# Patient Record
Sex: MALE | Race: WHITE | Employment: OTHER | URBAN - METROPOLITAN AREA
[De-identification: names, ages, dates, MRNs, and addresses within clinical notes are randomized per-mention and may not be internally consistent; named-entity substitution may affect disease eponyms.]

---

## 2017-01-26 ENCOUNTER — TELEPHONE (OUTPATIENT)
Dept: FAMILY MEDICINE CLINIC | Facility: CLINIC | Age: 42
End: 2017-01-26

## 2017-01-26 DIAGNOSIS — E11.9 TYPE 2 DIABETES MELLITUS WITHOUT COMPLICATION, WITHOUT LONG-TERM CURRENT USE OF INSULIN (HCC): Primary | ICD-10-CM

## 2017-01-26 RX ORDER — GLIMEPIRIDE 2 MG/1
2 TABLET ORAL 2 TIMES DAILY
COMMUNITY
Start: 2014-11-20 | End: 2017-01-26

## 2017-01-26 RX ORDER — FENOFIBRATE 48 MG/1
48 TABLET, COATED ORAL DAILY
COMMUNITY
Start: 2015-05-18 | End: 2017-08-04

## 2017-01-26 RX ORDER — ATORVASTATIN CALCIUM 10 MG/1
10 TABLET, FILM COATED ORAL DAILY
COMMUNITY
Start: 2015-02-22 | End: 2017-08-04

## 2017-01-26 RX ORDER — GLIMEPIRIDE 2 MG/1
2 TABLET ORAL 2 TIMES DAILY
Qty: 60 TABLET | Refills: 0 | Status: SHIPPED | OUTPATIENT
Start: 2017-01-26 | End: 2017-02-24

## 2017-01-26 RX ORDER — OMEPRAZOLE 20 MG/1
20 CAPSULE, DELAYED RELEASE ORAL DAILY
COMMUNITY
Start: 2007-02-08 | End: 2019-02-20

## 2017-01-26 NOTE — TELEPHONE ENCOUNTER
Last labs done 8/9/16:  HgbA1c 10.6%, elevated lipid panel. Orders for labs placed. Please tell pt he needs to be fasting for this. Will only refill one month of medications listed below. Keep scheduled appt.

## 2017-01-26 NOTE — TELEPHONE ENCOUNTER
Future Appointments  Date Time Provider Jaime Garcia   2/16/2017 8:45 AM REF [place] [facility]   LOV: 08/08/2016

## 2017-01-26 NOTE — TELEPHONE ENCOUNTER
Needs refill for metformin and glimiperide and also has upcoming appt for labs on 2/16 for repeat labs- orders need to be entered

## 2017-01-27 NOTE — TELEPHONE ENCOUNTER
Let pt know the following below. Pt verbalized his understanding and has no other questions at this time.

## 2017-02-06 ENCOUNTER — TELEPHONE (OUTPATIENT)
Dept: FAMILY MEDICINE CLINIC | Facility: CLINIC | Age: 42
End: 2017-02-06

## 2017-02-16 ENCOUNTER — LAB ENCOUNTER (OUTPATIENT)
Dept: LAB | Age: 42
End: 2017-02-16
Attending: FAMILY MEDICINE
Payer: COMMERCIAL

## 2017-02-16 DIAGNOSIS — E11.9 TYPE 2 DIABETES MELLITUS WITHOUT COMPLICATION, WITHOUT LONG-TERM CURRENT USE OF INSULIN (HCC): ICD-10-CM

## 2017-02-16 LAB
ALBUMIN SERPL-MCNC: 3.9 G/DL (ref 3.5–4.8)
ALP LIVER SERPL-CCNC: 90 U/L (ref 45–117)
ALT SERPL-CCNC: 56 U/L (ref 17–63)
AST SERPL-CCNC: 28 U/L (ref 15–41)
BILIRUB SERPL-MCNC: 0.5 MG/DL (ref 0.1–2)
BUN BLD-MCNC: 18 MG/DL (ref 8–20)
CALCIUM BLD-MCNC: 9 MG/DL (ref 8.3–10.3)
CHLORIDE: 99 MMOL/L (ref 101–111)
CHOLEST SMN-MCNC: 249 MG/DL (ref ?–200)
CO2: 30 MMOL/L (ref 22–32)
CREAT BLD-MCNC: 1.05 MG/DL (ref 0.7–1.3)
EST. AVERAGE GLUCOSE BLD GHB EST-MCNC: 266 MG/DL (ref 68–126)
GLUCOSE BLD-MCNC: 308 MG/DL (ref 70–99)
HBA1C MFR BLD HPLC: 10.9 % (ref ?–5.7)
HDLC SERPL-MCNC: 46 MG/DL (ref 45–?)
HDLC SERPL: 5.41 {RATIO} (ref ?–4.97)
LDLC SERPL DIRECT ASSAY-MCNC: 144 MG/DL (ref ?–100)
M PROTEIN MFR SERPL ELPH: 7.5 G/DL (ref 6.1–8.3)
NONHDLC SERPL-MCNC: 203 MG/DL (ref ?–130)
POTASSIUM SERPL-SCNC: 4.3 MMOL/L (ref 3.6–5.1)
SODIUM SERPL-SCNC: 135 MMOL/L (ref 136–144)
TRIGLYCERIDES: 432 MG/DL (ref ?–150)

## 2017-02-16 PROCEDURE — 80061 LIPID PANEL: CPT

## 2017-02-16 PROCEDURE — 83036 HEMOGLOBIN GLYCOSYLATED A1C: CPT

## 2017-02-16 PROCEDURE — 80053 COMPREHEN METABOLIC PANEL: CPT

## 2017-02-16 PROCEDURE — 83721 ASSAY OF BLOOD LIPOPROTEIN: CPT

## 2017-02-17 ENCOUNTER — TELEPHONE (OUTPATIENT)
Dept: FAMILY MEDICINE CLINIC | Facility: CLINIC | Age: 42
End: 2017-02-17

## 2017-02-17 NOTE — TELEPHONE ENCOUNTER
Scheduled appt. Future Appointments  Date Time Provider Jaime Garcia   2/23/2017 8:00 AM Princess Thomas MD [facility] [place] [facility]     Patient would like just some idea of what hes labs came back as. Dr Chace Crawford can you please advise. Thank you.

## 2017-02-17 NOTE — TELEPHONE ENCOUNTER
Please inform patient that his hemoglobin A1c and glucose is a very high. Also his triglyceride and LDL are also very very high. Recommend to make an appointment with me to discuss treatment option.

## 2017-02-17 NOTE — TELEPHONE ENCOUNTER
Let pt know the following below. Pt verbalized his understanding and had no other questions at this time.    Future Appointments  Date Time Provider Jaime Garcia   2/23/2017 8:00 AM Edda Baxter MD [facility] [place] [facility]

## 2017-02-17 NOTE — TELEPHONE ENCOUNTER
----- Message from Ambar Ramos MD sent at 2/16/2017  5:36 PM CST -----  Advice patient to make appt to discuss labs.

## 2017-02-23 ENCOUNTER — OFFICE VISIT (OUTPATIENT)
Dept: FAMILY MEDICINE CLINIC | Facility: CLINIC | Age: 42
End: 2017-02-23

## 2017-02-23 VITALS
HEIGHT: 64.5 IN | SYSTOLIC BLOOD PRESSURE: 116 MMHG | BODY MASS INDEX: 32.28 KG/M2 | WEIGHT: 191.38 LBS | TEMPERATURE: 97 F | HEART RATE: 78 BPM | DIASTOLIC BLOOD PRESSURE: 82 MMHG

## 2017-02-23 DIAGNOSIS — E11.8 UNCONTROLLED TYPE 2 DIABETES MELLITUS WITH COMPLICATION, WITHOUT LONG-TERM CURRENT USE OF INSULIN (HCC): Primary | ICD-10-CM

## 2017-02-23 DIAGNOSIS — I10 ESSENTIAL HYPERTENSION: ICD-10-CM

## 2017-02-23 DIAGNOSIS — E11.65 UNCONTROLLED TYPE 2 DIABETES MELLITUS WITH COMPLICATION, WITHOUT LONG-TERM CURRENT USE OF INSULIN (HCC): Primary | ICD-10-CM

## 2017-02-23 DIAGNOSIS — E78.1 PURE HYPERTRIGLYCERIDEMIA: ICD-10-CM

## 2017-02-23 PROCEDURE — 99214 OFFICE O/P EST MOD 30 MIN: CPT | Performed by: FAMILY MEDICINE

## 2017-02-23 NOTE — PROGRESS NOTES
[address]  PROGRESS NOTE  Chief Complaint:   Patient presents with: Follow - Up: on labs       HPI:   This is a 40year old male presents for follow-up on his labs and diabetes. Patient has been noncompliant with this medication.   He Use: No              Family History:  Family History   Problem Relation Age of Onset   • Diabetes Sister    • Diabetes Paternal Grandfather      Allergies:    Metformin                   Comment:Other reaction(s): diarrhea  Niacin                      Comm enlarged nodes   PSYCHIATRIC:  Denies depression or anxiety. ENDOCRINOLOGIC:  Denies excessive sweating, cold or heat intolerance, polyuria or polydipsia.         EXAM:   /82 mmHg  Pulse 78  Temp(Src) 97.3 °F (36.3 °C) (Tympanic)  Ht 64.5\"  Wt 191 l Exam due on 01/30/1976  Annual Physical due on 01/30/1978  Influenza Vaccine(1) due on 09/01/2016    Patient/Caregiver Education: Patient/Caregiver Education: There are no barriers to learning. Medical education done.    Outcome: Patient verbalizes Benita

## 2017-02-23 NOTE — PATIENT INSTRUCTIONS
Continue current medications   Increase metformin to 1000 mg twice a day   Advice low carb in diet, AVOID FOOD WITH HIGH GLYCEMIC INDEX, have smaller portion meal, no juice or soda, don't eat late at night, exercise, weight loss.    Continue to monitor gluc

## 2017-02-24 ENCOUNTER — TELEPHONE (OUTPATIENT)
Dept: FAMILY MEDICINE CLINIC | Facility: CLINIC | Age: 42
End: 2017-02-24

## 2017-02-24 DIAGNOSIS — E11.9 TYPE 2 DIABETES MELLITUS WITHOUT COMPLICATION, WITHOUT LONG-TERM CURRENT USE OF INSULIN (HCC): Primary | ICD-10-CM

## 2017-02-24 RX ORDER — GLIMEPIRIDE 2 MG/1
2 TABLET ORAL 2 TIMES DAILY
Qty: 60 TABLET | Refills: 2 | Status: SHIPPED | OUTPATIENT
Start: 2017-02-24 | End: 2017-06-05

## 2017-04-18 ENCOUNTER — OFFICE VISIT (OUTPATIENT)
Dept: FAMILY MEDICINE CLINIC | Facility: CLINIC | Age: 42
End: 2017-04-18

## 2017-04-18 VITALS
SYSTOLIC BLOOD PRESSURE: 120 MMHG | RESPIRATION RATE: 16 BRPM | HEIGHT: 64.75 IN | WEIGHT: 191.13 LBS | TEMPERATURE: 98 F | DIASTOLIC BLOOD PRESSURE: 66 MMHG | BODY MASS INDEX: 32.24 KG/M2 | HEART RATE: 82 BPM

## 2017-04-18 DIAGNOSIS — R10.84 GENERALIZED ABDOMINAL PAIN: Primary | ICD-10-CM

## 2017-04-18 DIAGNOSIS — E11.9 TYPE 2 DIABETES MELLITUS WITHOUT COMPLICATION, WITHOUT LONG-TERM CURRENT USE OF INSULIN (HCC): ICD-10-CM

## 2017-04-18 DIAGNOSIS — R19.7 DIARRHEA, UNSPECIFIED TYPE: ICD-10-CM

## 2017-04-18 PROCEDURE — 99214 OFFICE O/P EST MOD 30 MIN: CPT | Performed by: FAMILY MEDICINE

## 2017-04-18 NOTE — PROGRESS NOTES
[facility] [place]  PROGRESS NOTE  Chief Complaint:   Patient presents with:  Constipation: Patient is having issues going to the bathroom, has tried to eat more of a fiber diet not working      HPI:   This is a 40year old male coming in for ab mmol/L   -DIRECT LDL   Result Value Ref Range   Direct  (H) <100 mg/dL       Past Medical History    Diagnosis  Date    •  Pneumonia      •  Hypertension      •  Diabetes type 2, controlled (HCC)              Past Surgical History      VASECTOMY    production    GASTROINTESTINAL: SEE HPI     : denies dysuria, no urinary frequency , no discharge. MUSCULOSKELETAL:  Denies weakness, muscle aches, back pain, joint pain, swelling or stiffness.   NEUROLOGICAL:  Denies headache, , dizziness, syncope, para PLAN:   1. Generalized abdominal pain  2. Diarrhea, unspecified type    - referral [facility]    3.  Type 2 diabetes mellitus without complication, without long-term current use of insulin ([facility] [address]    - referral [facility]      Patient Instructions   Obtain referral f

## 2017-06-05 RX ORDER — GLIMEPIRIDE 2 MG/1
TABLET ORAL
Qty: 60 TABLET | Refills: 0 | Status: SHIPPED | OUTPATIENT
Start: 2017-06-05 | End: 2017-07-14

## 2017-06-05 NOTE — TELEPHONE ENCOUNTER
2/23/17 last OV with Dr. Xiao Barrett  2/16/17 last labs done    Chavo Mahoney, 06/05/2017, 11:23 AM

## 2017-07-14 RX ORDER — GLIMEPIRIDE 2 MG/1
2 TABLET ORAL 2 TIMES DAILY
Qty: 60 TABLET | Refills: 0 | Status: SHIPPED | OUTPATIENT
Start: 2017-07-14 | End: 2017-08-04

## 2017-07-14 RX ORDER — GLIMEPIRIDE 2 MG/1
TABLET ORAL
Qty: 60 TABLET | Refills: 0 | OUTPATIENT
Start: 2017-07-14

## 2017-07-14 NOTE — TELEPHONE ENCOUNTER
Future Appointments  Date Time Provider Jaime Garcia   8/4/2017 8:00 AM Abram Mann MD [facility] [place] [facility] [place]     Can you please send in the refill. Thank you.

## 2017-08-04 ENCOUNTER — OFFICE VISIT (OUTPATIENT)
Dept: FAMILY MEDICINE CLINIC | Facility: CLINIC | Age: 42
End: 2017-08-04

## 2017-08-04 VITALS
HEART RATE: 78 BPM | WEIGHT: 186.63 LBS | DIASTOLIC BLOOD PRESSURE: 80 MMHG | SYSTOLIC BLOOD PRESSURE: 124 MMHG | RESPIRATION RATE: 20 BRPM | TEMPERATURE: 98 F | BODY MASS INDEX: 31 KG/M2

## 2017-08-04 DIAGNOSIS — I10 ESSENTIAL HYPERTENSION: ICD-10-CM

## 2017-08-04 DIAGNOSIS — E11.8 CONTROLLED TYPE 2 DIABETES MELLITUS WITH COMPLICATION, WITHOUT LONG-TERM CURRENT USE OF INSULIN (HCC): Primary | ICD-10-CM

## 2017-08-04 DIAGNOSIS — E78.1 PURE HYPERTRIGLYCERIDEMIA: ICD-10-CM

## 2017-08-04 LAB
ALBUMIN SERPL-MCNC: 3.9 G/DL (ref 3.5–4.8)
ALP LIVER SERPL-CCNC: 92 U/L (ref 45–117)
ALT SERPL-CCNC: 44 U/L (ref 17–63)
AST SERPL-CCNC: 22 U/L (ref 15–41)
BASOPHILS # BLD AUTO: 0.05 X10(3) UL (ref 0–0.1)
BASOPHILS NFR BLD AUTO: 0.6 %
BILIRUB SERPL-MCNC: 0.6 MG/DL (ref 0.1–2)
BUN BLD-MCNC: 21 MG/DL (ref 8–20)
CALCIUM BLD-MCNC: 9.3 MG/DL (ref 8.3–10.3)
CHLORIDE: 102 MMOL/L (ref 101–111)
CHOLEST SMN-MCNC: 234 MG/DL (ref ?–200)
CO2: 29 MMOL/L (ref 22–32)
CREAT BLD-MCNC: 1.01 MG/DL (ref 0.7–1.3)
CREAT UR-SCNC: 294 MG/DL
EOSINOPHIL # BLD AUTO: 0.9 X10(3) UL (ref 0–0.3)
EOSINOPHIL NFR BLD AUTO: 10.4 %
ERYTHROCYTE [DISTWIDTH] IN BLOOD BY AUTOMATED COUNT: 12.4 % (ref 11.5–16)
EST. AVERAGE GLUCOSE BLD GHB EST-MCNC: 226 MG/DL (ref 68–126)
GLUCOSE BLD-MCNC: 237 MG/DL (ref 70–99)
HBA1C MFR BLD HPLC: 9.5 % (ref ?–5.7)
HCT VFR BLD AUTO: 49.7 % (ref 37–53)
HDLC SERPL-MCNC: 44 MG/DL (ref 45–?)
HDLC SERPL: 5.32 {RATIO} (ref ?–4.97)
HGB BLD-MCNC: 17.5 G/DL (ref 13–17)
IMMATURE GRANULOCYTE COUNT: 0.04 X10(3) UL (ref 0–1)
IMMATURE GRANULOCYTE RATIO %: 0.5 %
LDLC SERPL DIRECT ASSAY-MCNC: 121 MG/DL (ref ?–100)
LYMPHOCYTES # BLD AUTO: 2.8 X10(3) UL (ref 0.9–4)
LYMPHOCYTES NFR BLD AUTO: 32.4 %
M PROTEIN MFR SERPL ELPH: 7.7 G/DL (ref 6.1–8.3)
MCH RBC QN AUTO: 29.9 PG (ref 27–33.2)
MCHC RBC AUTO-ENTMCNC: 35.2 G/DL (ref 31–37)
MCV RBC AUTO: 84.8 FL (ref 80–99)
MICROALBUMIN UR-MCNC: 23.5 MG/DL
MICROALBUMIN/CREAT 24H UR-RTO: 79.9 UG/MG (ref ?–30)
MONOCYTES # BLD AUTO: 0.51 X10(3) UL (ref 0.1–0.6)
MONOCYTES NFR BLD AUTO: 5.9 %
NEUTROPHIL ABS PRELIM: 4.34 X10 (3) UL (ref 1.3–6.7)
NEUTROPHILS # BLD AUTO: 4.34 X10(3) UL (ref 1.3–6.7)
NEUTROPHILS NFR BLD AUTO: 50.2 %
NONHDLC SERPL-MCNC: 190 MG/DL (ref ?–130)
PLATELET # BLD AUTO: 226 10(3)UL (ref 150–450)
POTASSIUM SERPL-SCNC: 4.5 MMOL/L (ref 3.6–5.1)
RBC # BLD AUTO: 5.86 X10(6)UL (ref 4.3–5.7)
RED CELL DISTRIBUTION WIDTH-SD: 37.7 FL (ref 35.1–46.3)
SODIUM SERPL-SCNC: 138 MMOL/L (ref 136–144)
TRIGLYCERIDES: 527 MG/DL (ref ?–150)
WBC # BLD AUTO: 8.6 X10(3) UL (ref 4–13)

## 2017-08-04 PROCEDURE — 80053 COMPREHEN METABOLIC PANEL: CPT | Performed by: FAMILY MEDICINE

## 2017-08-04 PROCEDURE — 80061 LIPID PANEL: CPT | Performed by: FAMILY MEDICINE

## 2017-08-04 PROCEDURE — 83036 HEMOGLOBIN GLYCOSYLATED A1C: CPT | Performed by: FAMILY MEDICINE

## 2017-08-04 PROCEDURE — 82043 UR ALBUMIN QUANTITATIVE: CPT | Performed by: FAMILY MEDICINE

## 2017-08-04 PROCEDURE — 83721 ASSAY OF BLOOD LIPOPROTEIN: CPT | Performed by: FAMILY MEDICINE

## 2017-08-04 PROCEDURE — 99214 OFFICE O/P EST MOD 30 MIN: CPT | Performed by: FAMILY MEDICINE

## 2017-08-04 PROCEDURE — 82570 ASSAY OF URINE CREATININE: CPT | Performed by: FAMILY MEDICINE

## 2017-08-04 PROCEDURE — 85025 COMPLETE CBC W/AUTO DIFF WBC: CPT | Performed by: FAMILY MEDICINE

## 2017-08-04 RX ORDER — ATORVASTATIN CALCIUM 10 MG/1
10 TABLET, FILM COATED ORAL DAILY
Qty: 90 TABLET | Refills: 0 | Status: SHIPPED | OUTPATIENT
Start: 2017-08-04 | End: 2019-02-21

## 2017-08-04 RX ORDER — GLIMEPIRIDE 2 MG/1
2 TABLET ORAL 2 TIMES DAILY
Qty: 180 TABLET | Refills: 0 | Status: SHIPPED | OUTPATIENT
Start: 2017-08-04 | End: 2017-08-05

## 2017-08-04 RX ORDER — FENOFIBRATE 48 MG/1
48 TABLET, COATED ORAL DAILY
Qty: 90 TABLET | Refills: 0 | Status: SHIPPED | OUTPATIENT
Start: 2017-08-04 | End: 2018-06-29

## 2017-08-04 NOTE — PROGRESS NOTES
[address]  PROGRESS NOTE  Chief Complaint:   Patient presents with:  Diabetic Flu: bg 120 to 200. HPI:   This is a 40year old male presents for follow-up on diabetes, hypertension and hypercholesterol.   Patient's blood sugar at h Packs/day: 0.00      Years: 0.00         Quit date: 2/23/2016  Smokeless tobacco: Never Used                      Alcohol use:  No              Social History Narrative    , owns HVAC      Family History:  Fami numbness or tingling in the extremities,change in bowel or bladder control. HEMATOLOGIC:  Denies anemia, bleeding or bruising. LYMPHATICS:  Denies enlarged nodes   PSYCHIATRIC:  Denies depression or anxiety.   ENDOCRINOLOGIC:  Denies excessive sweating, c flu.    Diagnoses and all orders for this visit:    Controlled type 2 diabetes mellitus with complication, without long-term current use of insulin (HCC)  -     COMP METABOLIC PANEL (14)  -     HEMOGLOBIN A1C  -     MICROALB/CREAT RATIO, RANDOM URINE    Es

## 2017-08-04 NOTE — PATIENT INSTRUCTIONS
Continue current medications   Check labs today. Advice low carb in diet, AVOID FOOD WITH HIGH GLYCEMIC INDEX, have smaller portion meal, no juice or soda, don't eat late at night, exercise, weight loss.    Continue to monitor glucose level, call if gluco

## 2017-08-05 ENCOUNTER — TELEPHONE (OUTPATIENT)
Dept: FAMILY MEDICINE CLINIC | Facility: CLINIC | Age: 42
End: 2017-08-05

## 2017-08-05 RX ORDER — GLIMEPIRIDE 4 MG/1
4 TABLET ORAL 2 TIMES DAILY
Qty: 180 TABLET | Refills: 3 | Status: SHIPPED | OUTPATIENT
Start: 2017-08-05 | End: 2017-11-29

## 2017-08-05 NOTE — TELEPHONE ENCOUNTER
Please inform patient that his HgA1c is 9.5. Also his triglycerides and cholesterol is very high. He needs better control of his diabetes. His average glucose level is past 3 months is 226.    This is very dangerous, if his diabetes remains uncontrolled t

## 2017-08-05 NOTE — TELEPHONE ENCOUNTER
Patient informed of the below results and recommendations. Patient would like new script for Glimeperide faxed to [facility], [place]. Script pended for signing if correct. Please advise.  Steph Tidwell, 08/05/17, 8:54 AM

## 2017-10-26 NOTE — PROGRESS NOTES
[address] [place] [address]  PROGRESS NOTE  Mackenzie Long is a 40year old male. Chief Complaint:  Patient presents with:  Employment Physical    HPI:   Patient presents to office visit for work physical. Pt owns his own business.  Needs this physical co Onset   • Diabetes Sister    • Diabetes Paternal Grandfather      Allergies:    Niacin                      Comment:Other reaction(s): itching and burning  Current Meds:    Current Outpatient Prescriptions:  glimepiride 4 MG Oral Tab Take 1 tablet (4 mg to symmetrical  EXTREMITIES: no cyanosis, clubbing or edema  Psych: Alert and orientated ×3, appropriate affect    ASSESSMENT AND PLAN:   ASSESSMENT:  Type 2 diabetes mellitus without complication, without long-term current use of insulin (hcc)  Hyperlipidemi

## 2017-10-27 ENCOUNTER — OFFICE VISIT (OUTPATIENT)
Dept: FAMILY MEDICINE CLINIC | Facility: CLINIC | Age: 42
End: 2017-10-27

## 2017-10-27 ENCOUNTER — APPOINTMENT (OUTPATIENT)
Dept: LAB | Age: 42
End: 2017-10-27
Attending: NURSE PRACTITIONER
Payer: COMMERCIAL

## 2017-10-27 VITALS
DIASTOLIC BLOOD PRESSURE: 80 MMHG | WEIGHT: 189 LBS | BODY MASS INDEX: 32.27 KG/M2 | SYSTOLIC BLOOD PRESSURE: 110 MMHG | RESPIRATION RATE: 16 BRPM | HEIGHT: 64 IN | HEART RATE: 68 BPM | TEMPERATURE: 98 F

## 2017-10-27 DIAGNOSIS — Z00.00 ENCOUNTER FOR ANNUAL PHYSICAL EXAM: Primary | ICD-10-CM

## 2017-10-27 DIAGNOSIS — Z02.89 ENCOUNTER FOR PHYSICAL EXAMINATION RELATED TO EMPLOYMENT: ICD-10-CM

## 2017-10-27 DIAGNOSIS — E11.9 TYPE 2 DIABETES MELLITUS WITHOUT COMPLICATION, WITHOUT LONG-TERM CURRENT USE OF INSULIN (HCC): ICD-10-CM

## 2017-10-27 DIAGNOSIS — E78.5 HYPERLIPIDEMIA, UNSPECIFIED HYPERLIPIDEMIA TYPE: ICD-10-CM

## 2017-10-27 DIAGNOSIS — E66.9 CLASS 1 OBESITY WITHOUT SERIOUS COMORBIDITY WITH BODY MASS INDEX (BMI) OF 32.0 TO 32.9 IN ADULT, UNSPECIFIED OBESITY TYPE: ICD-10-CM

## 2017-10-27 PROBLEM — Z87.891 FORMER SMOKER: Status: ACTIVE | Noted: 2017-10-27

## 2017-10-27 PROCEDURE — 83036 HEMOGLOBIN GLYCOSYLATED A1C: CPT | Performed by: NURSE PRACTITIONER

## 2017-10-27 PROCEDURE — 36415 COLL VENOUS BLD VENIPUNCTURE: CPT | Performed by: NURSE PRACTITIONER

## 2017-10-27 PROCEDURE — 80053 COMPREHEN METABOLIC PANEL: CPT | Performed by: NURSE PRACTITIONER

## 2017-10-27 PROCEDURE — 99396 PREV VISIT EST AGE 40-64: CPT | Performed by: NURSE PRACTITIONER

## 2017-10-27 PROCEDURE — 80061 LIPID PANEL: CPT | Performed by: NURSE PRACTITIONER

## 2017-10-27 NOTE — PATIENT INSTRUCTIONS
Work form will be completed next week after blood work results are in. Will call when ready for . Blood work today. Return next month for follow up with PCP, Dr. Kari Brito.

## 2017-10-31 ENCOUNTER — TELEPHONE (OUTPATIENT)
Dept: FAMILY MEDICINE CLINIC | Facility: CLINIC | Age: 42
End: 2017-10-31

## 2017-10-31 NOTE — TELEPHONE ENCOUNTER
----- Message from ZELALEM Farris sent at 10/31/2017  8:02 AM CDT -----  CMP: Glucose 252, history type 2 diabetes. Rest of CMP essentially normal.  Make sure staying hydrated with water daily. Lipid panel:  Total cholesterol 264, which is higher than l

## 2017-10-31 NOTE — TELEPHONE ENCOUNTER
Patient notified of results and recommendation, patient states he has not been taking his atorvastatin regularly. States he barely take it. Notified will forward to PCP for further recommendations. Expressed understanding and thanks.

## 2017-11-01 ENCOUNTER — TELEPHONE (OUTPATIENT)
Dept: FAMILY MEDICINE CLINIC | Facility: CLINIC | Age: 42
End: 2017-11-01

## 2017-11-01 NOTE — TELEPHONE ENCOUNTER
Let pt know the following below. Pt verbalized his understanding and had no other questions at this time. Patient unable to set up appt at this time but will call back later today and set something up.

## 2017-11-01 NOTE — TELEPHONE ENCOUNTER
----- Message from Lolly Arias MD sent at 11/1/2017 12:59 PM CDT -----  Please inform patient to make appointment with me to discuss management of high glucose and cholesterol.

## 2017-11-29 ENCOUNTER — TELEPHONE (OUTPATIENT)
Dept: FAMILY MEDICINE CLINIC | Facility: CLINIC | Age: 42
End: 2017-11-29

## 2017-11-29 RX ORDER — GLIMEPIRIDE 4 MG/1
4 TABLET ORAL 2 TIMES DAILY
Qty: 180 TABLET | Refills: 0 | Status: SHIPPED | OUTPATIENT
Start: 2017-11-29 | End: 2018-03-23

## 2017-11-29 NOTE — TELEPHONE ENCOUNTER
Patient is calling stating that he is out of his glimepiride and there are no refills at the pharmacy for him. Patient stated that it was discussed with Dr Sabrina Pineda that Dr Sabrina Pineda wanted him to start a insulin injection.  Patient has been seeing advertisement

## 2017-11-29 NOTE — TELEPHONE ENCOUNTER
Let pt know the following below. Pt verbalized his understanding and had no other questions at this time.    Future Appointments  Date Time Provider Jaime Garcia   12/8/2017 8:00 AM Brayan Howell MD [facility] [place] [facility]

## 2017-11-29 NOTE — TELEPHONE ENCOUNTER
Recommend to continue with glimepiride. Lulla Barthel will be in addition and not instead of glimepiride. Will discuss further at follow up appointment. Recommend appointment. Prescription sent.

## 2017-12-08 ENCOUNTER — OFFICE VISIT (OUTPATIENT)
Dept: FAMILY MEDICINE CLINIC | Facility: CLINIC | Age: 42
End: 2017-12-08

## 2017-12-08 VITALS
RESPIRATION RATE: 16 BRPM | BODY MASS INDEX: 31.39 KG/M2 | HEART RATE: 78 BPM | HEIGHT: 65 IN | WEIGHT: 188.38 LBS | SYSTOLIC BLOOD PRESSURE: 128 MMHG | TEMPERATURE: 98 F | DIASTOLIC BLOOD PRESSURE: 80 MMHG

## 2017-12-08 DIAGNOSIS — E11.8 CONTROLLED TYPE 2 DIABETES MELLITUS WITH COMPLICATION, WITHOUT LONG-TERM CURRENT USE OF INSULIN (HCC): Primary | ICD-10-CM

## 2017-12-08 DIAGNOSIS — E78.1 PURE HYPERTRIGLYCERIDEMIA: ICD-10-CM

## 2017-12-08 PROCEDURE — 99214 OFFICE O/P EST MOD 30 MIN: CPT | Performed by: FAMILY MEDICINE

## 2017-12-08 RX ORDER — METFORMIN HYDROCHLORIDE 1000 MG/1
1000 TABLET, FILM COATED, EXTENDED RELEASE ORAL
Qty: 30 TABLET | Refills: 2 | Status: SHIPPED | OUTPATIENT
Start: 2017-12-08 | End: 2017-12-08

## 2017-12-08 RX ORDER — METFORMIN HYDROCHLORIDE 500 MG/1
500 TABLET, EXTENDED RELEASE ORAL 2 TIMES DAILY WITH MEALS
Qty: 60 TABLET | Refills: 2 | Status: SHIPPED | OUTPATIENT
Start: 2017-12-08 | End: 2018-03-23

## 2017-12-08 NOTE — PATIENT INSTRUCTIONS
Change metformin to extended release due to diarrhea  Start januvia  Continue with glimeperide. Advice low carb in diet, AVOID FOOD WITH HIGH GLYCEMIC INDEX, have smaller portion meal, no juice or soda, don't eat late at night, exercise, weight loss.    C

## 2017-12-08 NOTE — PROGRESS NOTES
[address]  PROGRESS NOTE  Chief Complaint:   Patient presents with: Follow - Up: diabetes check, BG 160s-200s      HPI:   This is a 40year old male with hx of diabetes and hypertriglycerdemia presents for follow up.  Patient's glucose , owns HVAC      Family History:  Family History   Problem Relation Age of Onset   • Diabetes Sister    • Diabetes Paternal Grandfather      Allergies:    Niacin                      Comment:Other reaction(s): itching and burning  Current Meds:    C cold or heat intolerance, polyuria or polydipsia.       EXAM:   /80 (BP Location: Left arm, Patient Position: Sitting, Cuff Size: adult)   Pulse 78   Temp 98.3 °F (36.8 °C) (Temporal)   Resp 16   Ht 65\"   Wt 188 lb 6.4 oz   BMI 31.35 kg/m²  Estimated GLYCEMIC INDEX, have smaller portion meal, no juice or soda, don't eat late at night, exercise, weight loss. Continue to monitor glucose level, call if glucose level less than 70 or more than 300.           Health Maintenance:  Pneumococcal PPSV23 Medium

## 2018-01-22 ENCOUNTER — TELEPHONE (OUTPATIENT)
Dept: FAMILY MEDICINE CLINIC | Facility: CLINIC | Age: 43
End: 2018-01-22

## 2018-01-22 DIAGNOSIS — Z11.1 ENCOUNTER FOR TB TINE TEST: Primary | ICD-10-CM

## 2018-01-22 NOTE — TELEPHONE ENCOUNTER
Patient called back and states he is able to have the Quantiferon Gold blood test done    Last OV with Dr. Clover Santillan 12/8/17  [id number] [address] for order for Frankia Corinne?     Eric Mahoney, 01/22/18, 5:00 PM

## 2018-01-22 NOTE — TELEPHONE ENCOUNTER
Patient notified  Lab appt made for patient next week  Needs by 2/27/18    Roberto Taveras, 01/22/18, 5:21 PM

## 2018-01-22 NOTE — TELEPHONE ENCOUNTER
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     Traci Mahoney, 01/22/18, 4:58

## 2018-01-30 ENCOUNTER — APPOINTMENT (OUTPATIENT)
Dept: LAB | Age: 43
End: 2018-01-30
Attending: FAMILY MEDICINE
Payer: COMMERCIAL

## 2018-01-30 DIAGNOSIS — Z11.1 ENCOUNTER FOR TB TINE TEST: ICD-10-CM

## 2018-01-30 PROCEDURE — 86480 TB TEST CELL IMMUN MEASURE: CPT | Performed by: FAMILY MEDICINE

## 2018-01-30 PROCEDURE — 36415 COLL VENOUS BLD VENIPUNCTURE: CPT | Performed by: FAMILY MEDICINE

## 2018-02-02 ENCOUNTER — TELEPHONE (OUTPATIENT)
Dept: FAMILY MEDICINE CLINIC | Facility: CLINIC | Age: 43
End: 2018-02-02

## 2018-02-02 LAB
M TB TUBERC IFN-G BLD QL: NEGATIVE
M TB TUBERC IFN-G/MITOGEN IGNF BLD: 0 IU/ML
M TB TUBERC IGNF/MITOGEN IGNF CONTROL: >10 IU/ML
MITOGEN IGNF BCKGRD COR BLD-ACNC: 0.01 IU/ML

## 2018-02-02 NOTE — TELEPHONE ENCOUNTER
----- Message from Scottie Omer MD sent at 2/2/2018  4:03 PM CST -----  Please inform patient that quntiferon TB gold test was negative.

## 2018-02-12 ENCOUNTER — OFFICE VISIT (OUTPATIENT)
Dept: FAMILY MEDICINE CLINIC | Facility: CLINIC | Age: 43
End: 2018-02-12

## 2018-02-12 VITALS
TEMPERATURE: 97 F | HEART RATE: 85 BPM | BODY MASS INDEX: 32.76 KG/M2 | WEIGHT: 196.63 LBS | SYSTOLIC BLOOD PRESSURE: 110 MMHG | DIASTOLIC BLOOD PRESSURE: 84 MMHG | OXYGEN SATURATION: 98 % | RESPIRATION RATE: 16 BRPM | HEIGHT: 65 IN

## 2018-02-12 DIAGNOSIS — J06.9 VIRAL UPPER RESPIRATORY TRACT INFECTION: Primary | ICD-10-CM

## 2018-02-12 PROCEDURE — 99213 OFFICE O/P EST LOW 20 MIN: CPT | Performed by: FAMILY MEDICINE

## 2018-02-12 RX ORDER — CEPHALEXIN 500 MG/1
500 CAPSULE ORAL 3 TIMES DAILY
Qty: 30 CAPSULE | Refills: 0 | Status: SHIPPED | OUTPATIENT
Start: 2018-02-12 | End: 2018-07-25 | Stop reason: ALTCHOICE

## 2018-02-12 NOTE — PROGRESS NOTES
[facility] [place]  PROGRESS NOTE  Chief Complaint:   Patient presents with:  Cough  Chest Congestion  Fever      HPI:   This is a 44year old male coming in for productive cough and head congestion for the past 48 hours.   May have had low-grade tablet Rfl: 0   atorvastatin (LIPITOR) 10 MG Oral Tab Take 1 tablet (10 mg total) by mouth daily. Disp: 90 tablet Rfl: 0   Fenofibrate 48 MG Oral Tab Take 1 tablet (48 mg total) by mouth daily.  Disp: 90 tablet Rfl: 0   omeprazole (PRILOSEC) 20 MG Oral Caps medications. Recheck if no improvement.       Health Maintenance:  Pneumococcal PPSV23 Medium Risk Adult(1 of 1 - PPSV23) due on 01/30/1995  Influenza Vaccine(1) due on 09/01/2017  Diabetes Care A1C due on 01/27/2018    Patient/Caregiver Education: Patient

## 2018-03-23 RX ORDER — GLIMEPIRIDE 4 MG/1
4 TABLET ORAL 2 TIMES DAILY
Qty: 180 TABLET | Refills: 0 | Status: SHIPPED | OUTPATIENT
Start: 2018-03-23 | End: 2018-07-17

## 2018-03-23 RX ORDER — METFORMIN HYDROCHLORIDE 500 MG/1
500 TABLET, EXTENDED RELEASE ORAL 2 TIMES DAILY WITH MEALS
Qty: 180 TABLET | Refills: 0 | Status: SHIPPED | OUTPATIENT
Start: 2018-03-23 | End: 2018-07-17

## 2018-03-23 NOTE — TELEPHONE ENCOUNTER
Needs refill on Metformin, Glimepiride, & one other med to [facility] in [place].

## 2018-03-23 NOTE — TELEPHONE ENCOUNTER
Requesting Refill:  Metformin 500mg BID #180  Gimipiride 4mg BID #180  Januvia 100mg #90    [facility].   Ingrid Hodges, 03/23/18, 9:15 AM  Future appt:    Last Appointment:  12/8/2017    Cholesterol, Total (mg/dL)   Date Value   10/27/2017 264 (H)   --------

## 2018-06-29 RX ORDER — FENOFIBRATE 48 MG/1
48 TABLET, COATED ORAL DAILY
Qty: 90 TABLET | Refills: 0 | Status: SHIPPED | OUTPATIENT
Start: 2018-06-29 | End: 2019-02-20

## 2018-06-29 NOTE — TELEPHONE ENCOUNTER
Future appt:     Your appointments     Date & Time Appointment Department [facility])    Jul 25, 2018  8:00 AM CDT Exam - Established Patient with Aura Titus MD [facility] [place] ([facility])        Kim Mccormick

## 2018-07-17 NOTE — TELEPHONE ENCOUNTER
Please advise refill of glyburide 4mg and metformin 500mg. Last Rx: 3/23/18    Future appt:     Your appointments     Date & Time Appointment Department [facility])    Jul 25, 2018  8:00 AM CDT Exam - Established Patient with Caren Will MD [facility]

## 2018-07-17 NOTE — TELEPHONE ENCOUNTER
Refill of medication   Glimepiride & Metformin please call into [facility].     Is out of Glimepiride and only 4 pills left of the metformin

## 2018-07-18 RX ORDER — GLIMEPIRIDE 4 MG/1
4 TABLET ORAL 2 TIMES DAILY
Qty: 180 TABLET | Refills: 0 | Status: SHIPPED | OUTPATIENT
Start: 2018-07-18 | End: 2019-02-20

## 2018-07-18 RX ORDER — METFORMIN HYDROCHLORIDE 500 MG/1
500 TABLET, EXTENDED RELEASE ORAL 2 TIMES DAILY WITH MEALS
Qty: 180 TABLET | Refills: 0 | Status: SHIPPED | OUTPATIENT
Start: 2018-07-18 | End: 2018-07-25

## 2018-07-25 ENCOUNTER — OFFICE VISIT (OUTPATIENT)
Dept: FAMILY MEDICINE CLINIC | Facility: CLINIC | Age: 43
End: 2018-07-25
Payer: COMMERCIAL

## 2018-07-25 ENCOUNTER — TELEPHONE (OUTPATIENT)
Dept: FAMILY MEDICINE CLINIC | Facility: CLINIC | Age: 43
End: 2018-07-25

## 2018-07-25 VITALS
WEIGHT: 185.63 LBS | DIASTOLIC BLOOD PRESSURE: 84 MMHG | SYSTOLIC BLOOD PRESSURE: 148 MMHG | BODY MASS INDEX: 30.93 KG/M2 | TEMPERATURE: 98 F | RESPIRATION RATE: 16 BRPM | HEART RATE: 80 BPM | HEIGHT: 65 IN

## 2018-07-25 DIAGNOSIS — E78.1 PURE HYPERTRIGLYCERIDEMIA: ICD-10-CM

## 2018-07-25 DIAGNOSIS — E11.8 CONTROLLED TYPE 2 DIABETES MELLITUS WITH COMPLICATION, WITHOUT LONG-TERM CURRENT USE OF INSULIN (HCC): Primary | ICD-10-CM

## 2018-07-25 LAB
ALBUMIN SERPL-MCNC: 3.8 G/DL (ref 3.5–4.8)
ALBUMIN/GLOB SERPL: 1 {RATIO} (ref 1–2)
ALP LIVER SERPL-CCNC: 88 U/L (ref 45–117)
ALT SERPL-CCNC: 48 U/L (ref 17–63)
ANION GAP SERPL CALC-SCNC: 10 MMOL/L (ref 0–18)
AST SERPL-CCNC: 29 U/L (ref 15–41)
BILIRUB SERPL-MCNC: 0.6 MG/DL (ref 0.1–2)
BUN BLD-MCNC: 21 MG/DL (ref 8–20)
BUN/CREAT SERPL: 20.4 (ref 10–20)
CALCIUM BLD-MCNC: 9.2 MG/DL (ref 8.3–10.3)
CHLORIDE SERPL-SCNC: 98 MMOL/L (ref 101–111)
CHOLEST SMN-MCNC: 298 MG/DL (ref ?–200)
CO2 SERPL-SCNC: 29 MMOL/L (ref 22–32)
CREAT BLD-MCNC: 1.03 MG/DL (ref 0.7–1.3)
CREAT UR-SCNC: 241 MG/DL
EST. AVERAGE GLUCOSE BLD GHB EST-MCNC: 252 MG/DL (ref 68–126)
GLOBULIN PLAS-MCNC: 3.8 G/DL (ref 2.5–3.7)
GLUCOSE BLD-MCNC: 280 MG/DL (ref 70–99)
HBA1C MFR BLD HPLC: 10.4 % (ref ?–5.7)
HDLC SERPL-MCNC: 39 MG/DL (ref 40–59)
LDLC SERPL DIRECT ASSAY-MCNC: 145 MG/DL (ref ?–100)
M PROTEIN MFR SERPL ELPH: 7.6 G/DL (ref 6.1–8.3)
MICROALBUMIN UR-MCNC: 59.6 MG/DL
MICROALBUMIN/CREAT 24H UR-RTO: 247.3 UG/MG (ref ?–30)
NONHDLC SERPL-MCNC: 259 MG/DL (ref ?–130)
OSMOLALITY SERPL CALC.SUM OF ELEC: 297 MOSM/KG (ref 275–295)
POTASSIUM SERPL-SCNC: 4.3 MMOL/L (ref 3.6–5.1)
SODIUM SERPL-SCNC: 137 MMOL/L (ref 136–144)
TRIGL SERPL-MCNC: 791 MG/DL (ref 30–149)

## 2018-07-25 PROCEDURE — 99214 OFFICE O/P EST MOD 30 MIN: CPT | Performed by: FAMILY MEDICINE

## 2018-07-25 PROCEDURE — 80053 COMPREHEN METABOLIC PANEL: CPT | Performed by: FAMILY MEDICINE

## 2018-07-25 PROCEDURE — 83036 HEMOGLOBIN GLYCOSYLATED A1C: CPT | Performed by: FAMILY MEDICINE

## 2018-07-25 PROCEDURE — 82570 ASSAY OF URINE CREATININE: CPT | Performed by: FAMILY MEDICINE

## 2018-07-25 PROCEDURE — 80061 LIPID PANEL: CPT | Performed by: FAMILY MEDICINE

## 2018-07-25 PROCEDURE — 82043 UR ALBUMIN QUANTITATIVE: CPT | Performed by: FAMILY MEDICINE

## 2018-07-25 RX ORDER — LISINOPRIL 5 MG/1
5 TABLET ORAL DAILY
Qty: 30 TABLET | Refills: 2 | Status: SHIPPED | OUTPATIENT
Start: 2018-07-25 | End: 2018-10-15

## 2018-07-25 RX ORDER — METFORMIN HYDROCHLORIDE 1000 MG/1
1000 TABLET, FILM COATED, EXTENDED RELEASE ORAL 2 TIMES DAILY WITH MEALS
Qty: 60 TABLET | Refills: 0 | Status: SHIPPED | OUTPATIENT
Start: 2018-07-25 | End: 2018-08-03

## 2018-07-25 NOTE — PROGRESS NOTES
[address]  PROGRESS NOTE  Chief Complaint:   Patient presents with: Follow - Up: med check      HPI:   This is a 44year old male with history of diabetes and hypercholesterolemia presents for follow-up and medication refill.     Has no SITagliptin Phosphate 100 MG Oral Tab Take 1 tablet (100 mg total) by mouth daily. Disp: 90 tablet Rfl: 0   Fenofibrate 48 MG Oral Tab Take 1 tablet (48 mg total) by mouth daily.  Disp: 90 tablet Rfl: 0   atorvastatin (LIPITOR) 10 MG Oral Tab Take 1 table anicteric, conjunctiva normal, PERRLA, EOMI. NECK: Supple, no carotid bruit, no JVD, no thyromegaly. LUNGS: Clear to auscultation bilterally, no rales/rhonchi/wheezing. HEART:  Regular rate and rhythm, S1 and S2 are normal, no murmurs, rubs or gallops. diastolic more than 892.          Health Maintenance:  Pneumococcal PPSV23 Medium Risk Adult(1 of 1 - PPSV23) due on 01/30/1995  Diabetes Care A1C due on 01/27/2018    Patient/Caregiver Education: Patient/Caregiver Education: There are no barriers to Anurag Howe

## 2018-07-25 NOTE — TELEPHONE ENCOUNTER
Please inform patient that his hemoglobin A1c has gone up 210.4 from 9.2. His diabetes is not very well controlled, recommend to increase metformin 2000 mg twice a day. He needs to watch carb very strictly, also recommend exercise and weight loss.   Recom

## 2018-07-25 NOTE — TELEPHONE ENCOUNTER
Informed pt of his blood work results. Pt would like to see dietician for this sugar and cholesterol readings. Also the note states take 2000 mg bid of metformin. Will send back to Dr. Chas White for his verfication of this dose.      Pt will continue

## 2018-07-25 NOTE — PATIENT INSTRUCTIONS
Continue current medications   Needs better control of diabetes. Check labs today. Advice low carb in diet, AVOID FOOD WITH HIGH GLYCEMIC INDEX, have smaller portion meal, no juice or soda, don't eat late at night, exercise, weight loss.    Continue to

## 2018-07-26 NOTE — TELEPHONE ENCOUNTER
Please give information for diabetic educators at [facility].  It should be metformin 1000 milligrams twice a day.

## 2018-08-03 ENCOUNTER — TELEPHONE (OUTPATIENT)
Dept: FAMILY MEDICINE CLINIC | Facility: CLINIC | Age: 43
End: 2018-08-03

## 2018-08-03 RX ORDER — METFORMIN HYDROCHLORIDE 750 MG/1
750 TABLET, EXTENDED RELEASE ORAL 2 TIMES DAILY WITH MEALS
Qty: 60 TABLET | Refills: 2 | Status: SHIPPED | OUTPATIENT
Start: 2018-08-03 | End: 2019-01-11

## 2018-08-03 RX ORDER — METFORMIN HYDROCHLORIDE 500 MG/1
500 TABLET, EXTENDED RELEASE ORAL
Qty: 30 TABLET | Refills: 2 | Status: SHIPPED | OUTPATIENT
Start: 2018-08-03 | End: 2019-02-20

## 2018-08-03 NOTE — TELEPHONE ENCOUNTER
Since increasing the metformin to 1000 mg and due to it being Extended Release tablets, the 1000 mg BID will cost $600.      Pharmacy tech states that we can either switch the metformin to regular 1000 mg BID is reasonably priced or metformin  mg BID

## 2018-08-03 NOTE — TELEPHONE ENCOUNTER
I have sent Metformin  mg twice a day breakfast and after dinner. And metformin  mg after lunch. Recommend to call if any other concern.

## 2018-10-08 ENCOUNTER — TELEPHONE (OUTPATIENT)
Dept: FAMILY MEDICINE CLINIC | Facility: CLINIC | Age: 43
End: 2018-10-08

## 2018-10-08 DIAGNOSIS — E11.8 CONTROLLED TYPE 2 DIABETES MELLITUS WITH COMPLICATION, WITHOUT LONG-TERM CURRENT USE OF INSULIN (HCC): ICD-10-CM

## 2018-10-08 DIAGNOSIS — E78.1 PURE HYPERTRIGLYCERIDEMIA: Primary | ICD-10-CM

## 2018-10-09 RX ORDER — SITAGLIPTIN 100 MG/1
TABLET, FILM COATED ORAL
Qty: 90 TABLET | Refills: 0 | Status: SHIPPED | OUTPATIENT
Start: 2018-10-09 | End: 2019-02-20

## 2018-10-09 NOTE — TELEPHONE ENCOUNTER
Future appt:     Your appointments     Date & Time Appointment Department [facility])    Oct 15, 2018  8:30 AM CDT Physical - Established Patient with ZELALEM Sanabria [address] [facility] [place] ([facility]        Edw

## 2018-10-09 NOTE — TELEPHONE ENCOUNTER
Please advise lab orders.     Future Appointments   Date Time Provider Jaime Garcia   10/15/2018  8:30 AM Rabia Bingham APN [facility] [place] [facility] [place]

## 2018-10-10 NOTE — TELEPHONE ENCOUNTER
Patient informed of the below orders. States he plans to come fasting for his appt with Brit and will have labs drawn after that appt.

## 2018-10-15 ENCOUNTER — LAB ENCOUNTER (OUTPATIENT)
Dept: LAB | Age: 43
End: 2018-10-15
Attending: NURSE PRACTITIONER
Payer: COMMERCIAL

## 2018-10-15 ENCOUNTER — OFFICE VISIT (OUTPATIENT)
Dept: FAMILY MEDICINE CLINIC | Facility: CLINIC | Age: 43
End: 2018-10-15
Payer: COMMERCIAL

## 2018-10-15 VITALS
SYSTOLIC BLOOD PRESSURE: 120 MMHG | WEIGHT: 188.38 LBS | TEMPERATURE: 98 F | HEIGHT: 65 IN | BODY MASS INDEX: 31.39 KG/M2 | HEART RATE: 76 BPM | RESPIRATION RATE: 20 BRPM | DIASTOLIC BLOOD PRESSURE: 84 MMHG

## 2018-10-15 DIAGNOSIS — E78.1 PURE HYPERTRIGLYCERIDEMIA: ICD-10-CM

## 2018-10-15 DIAGNOSIS — E78.1 PURE HYPERGLYCERIDEMIA: Primary | ICD-10-CM

## 2018-10-15 DIAGNOSIS — E11.8 CONTROLLED TYPE 2 DIABETES MELLITUS WITH COMPLICATION, WITHOUT LONG-TERM CURRENT USE OF INSULIN (HCC): ICD-10-CM

## 2018-10-15 DIAGNOSIS — Z00.00 ENCOUNTER FOR HEALTH MAINTENANCE EXAMINATION IN ADULT: Primary | ICD-10-CM

## 2018-10-15 PROCEDURE — 80050 GENERAL HEALTH PANEL: CPT | Performed by: NURSE PRACTITIONER

## 2018-10-15 PROCEDURE — 84439 ASSAY OF FREE THYROXINE: CPT | Performed by: NURSE PRACTITIONER

## 2018-10-15 PROCEDURE — 82043 UR ALBUMIN QUANTITATIVE: CPT | Performed by: NURSE PRACTITIONER

## 2018-10-15 PROCEDURE — 82570 ASSAY OF URINE CREATININE: CPT | Performed by: NURSE PRACTITIONER

## 2018-10-15 PROCEDURE — 36415 COLL VENOUS BLD VENIPUNCTURE: CPT | Performed by: NURSE PRACTITIONER

## 2018-10-15 PROCEDURE — 83036 HEMOGLOBIN GLYCOSYLATED A1C: CPT | Performed by: NURSE PRACTITIONER

## 2018-10-15 PROCEDURE — 82607 VITAMIN B-12: CPT | Performed by: NURSE PRACTITIONER

## 2018-10-15 PROCEDURE — 80061 LIPID PANEL: CPT | Performed by: NURSE PRACTITIONER

## 2018-10-15 PROCEDURE — 82550 ASSAY OF CK (CPK): CPT | Performed by: NURSE PRACTITIONER

## 2018-10-15 PROCEDURE — 90471 IMMUNIZATION ADMIN: CPT | Performed by: NURSE PRACTITIONER

## 2018-10-15 PROCEDURE — 90686 IIV4 VACC NO PRSV 0.5 ML IM: CPT | Performed by: NURSE PRACTITIONER

## 2018-10-15 PROCEDURE — 99396 PREV VISIT EST AGE 40-64: CPT | Performed by: NURSE PRACTITIONER

## 2018-10-15 PROCEDURE — 93000 ELECTROCARDIOGRAM COMPLETE: CPT | Performed by: NURSE PRACTITIONER

## 2018-10-15 RX ORDER — KRILL/OM-3/DHA/EPA/PHOSPHO/AST 500MG-86MG
1 CAPSULE ORAL 2 TIMES DAILY
COMMUNITY

## 2018-10-15 RX ORDER — OMEPRAZOLE 20 MG/1
20 CAPSULE, DELAYED RELEASE ORAL
COMMUNITY

## 2018-10-15 RX ORDER — MULTIVITAMIN WITH IRON
TABLET ORAL
COMMUNITY
End: 2019-02-20

## 2018-10-15 NOTE — PATIENT INSTRUCTIONS
Check EKG today     Flu shot today, I do recommend one every year in the fall. We will try to find records for your tetanus shot and pneumonia shot. Check fasting blood work today- I will hold on to your form until the results are back.     Recommend st

## 2018-10-15 NOTE — PROGRESS NOTES
HPI:    Patient ID: Carmela Hill is a 44year old male. HPI patient is here for a physical.  He has a form from his insurance company to have fasting blood sugar and cholesterol done.   Patient denies complaints, overall states he feels well.  -Patient Hematological: Negative. Psychiatric/Behavioral: Negative. Current Outpatient Medications:  Krill Oil 500 MG Oral Cap Take 1 capsule by mouth 2 (two) times daily.  Disp:  Rfl:    JANUVIA 100 MG Oral Tab TAKE 1 TABLET BY MOUTH ONCE DAILY and penis normal.   Musculoskeletal: Normal range of motion.    Bilateral barefoot skin diabetic exam is normal, visualized feet and the appearance is normal.  Bilateral monofilament/sensation of both feet is normal.  Pulsation pedal pulse exam of both lowe

## 2018-10-16 ENCOUNTER — TELEPHONE (OUTPATIENT)
Dept: FAMILY MEDICINE CLINIC | Facility: CLINIC | Age: 43
End: 2018-10-16

## 2018-10-16 ENCOUNTER — MED REC SCAN ONLY (OUTPATIENT)
Dept: FAMILY MEDICINE CLINIC | Facility: CLINIC | Age: 43
End: 2018-10-16

## 2018-10-16 NOTE — TELEPHONE ENCOUNTER
----- Message from ZELALEM Peck sent at 10/16/2018  1:19 PM CDT -----  Please notify patient that his hemoglobin A1c did improve from 10.4-9.0.   This is still very high–he plans on following up with an endocrinologist please let me know if he needs

## 2018-10-16 NOTE — TELEPHONE ENCOUNTER
Jen Madsen completed insurance form and form faxed to [employer] [phone]. Copy made for scanning and original left at  for patient to .

## 2018-10-16 NOTE — TELEPHONE ENCOUNTER
Patient notified of lab results and Lily's instructions.  Patient does have a referral from diabetes Ed to an endocrinologist.

## 2018-10-16 NOTE — TELEPHONE ENCOUNTER
----- Message from ZELALEM Mason sent at 10/16/2018  1:16 PM CDT -----  Please notify patient that his B12 level is normal see also other result note.

## 2018-10-17 NOTE — TELEPHONE ENCOUNTER
Pt verbalized understanding. Pt came into office to  insurance forms this morning and stated that there are errors on the form. I reviewed the forms with the pt and told him that I will discuss the errors/questions that he has with Nori Su tomorrow.

## 2018-10-22 ENCOUNTER — TELEPHONE (OUTPATIENT)
Dept: FAMILY MEDICINE CLINIC | Facility: CLINIC | Age: 43
End: 2018-10-22

## 2018-10-23 NOTE — TELEPHONE ENCOUNTER
Patient informed per Dominique Callaway that the corrected forms were faxed with a confirmation fax that came back. Patient states that they do have the corrected forms and that they will contacting   The insurance company to let them know they do have the forms.   Isrrael

## 2018-12-12 NOTE — TELEPHONE ENCOUNTER
Informed patient of the following below and he stated that he went to [facility] diabetic center and they recommended that he see a specialist. Patient states that he will call back with his decision on who he is going to follow up with.  No other questions at th

## 2018-12-12 NOTE — TELEPHONE ENCOUNTER
Please inform patient that his hemoglobin A1c was very high last office visit, recommend to schedule appointment with me before refilling medication. Recommend to bring his glucose monitor for review during office visit.

## 2018-12-27 RX ORDER — GLIMEPIRIDE 4 MG/1
TABLET ORAL
Qty: 180 TABLET | Refills: 0 | OUTPATIENT
Start: 2018-12-27

## 2018-12-27 RX ORDER — METFORMIN HYDROCHLORIDE 750 MG/1
TABLET, EXTENDED RELEASE ORAL
Qty: 60 TABLET | Refills: 2 | OUTPATIENT
Start: 2018-12-27

## 2019-01-11 RX ORDER — METFORMIN HYDROCHLORIDE 750 MG/1
TABLET, EXTENDED RELEASE ORAL
Qty: 60 TABLET | Refills: 0 | Status: SHIPPED | OUTPATIENT
Start: 2019-01-11 | End: 2019-02-20

## 2019-01-11 NOTE — TELEPHONE ENCOUNTER
Please advise refill of metformin 750 mg.     Future appt:    Last Appointment:  10/15/18 physical with Lali Taylor  Cholesterol, Total (mg/dL)   Date Value   10/15/2018 211 (H)     HDL Cholesterol (mg/dL)   Date Value   10/15/2018 43     LDL Cholesterol (mg/dL)

## 2019-01-31 ENCOUNTER — TELEPHONE (OUTPATIENT)
Dept: FAMILY MEDICINE CLINIC | Facility: CLINIC | Age: 44
End: 2019-01-31

## 2019-01-31 NOTE — TELEPHONE ENCOUNTER
Patient has history of uncontrolled diabetes type 2. Patient's last lab was done on 10/15/2018 and his hemoglobin A1c was 9.0. Patient is due for his recheck of his diabetes. Recommend to schedule appointment as soon as possible.

## 2019-02-04 RX ORDER — METFORMIN HYDROCHLORIDE 750 MG/1
TABLET, EXTENDED RELEASE ORAL
Qty: 60 TABLET | Refills: 0 | OUTPATIENT
Start: 2019-02-04

## 2019-02-04 RX ORDER — SITAGLIPTIN 100 MG/1
TABLET, FILM COATED ORAL
Qty: 90 TABLET | Refills: 0 | OUTPATIENT
Start: 2019-02-04

## 2019-02-04 NOTE — TELEPHONE ENCOUNTER
Future appt:     Last Appointment:  7/25/2018; Return in about 3 months (around 10/25/2018) for physical.     Cholesterol, Total (mg/dL)   Date Value   10/15/2018 211 (H)     HDL Cholesterol (mg/dL)   Date Value   10/15/2018 43     LDL Cholesterol (mg/dL)

## 2019-02-04 NOTE — TELEPHONE ENCOUNTER
Patient is due for appointment. Recommend to schedule appointment with me for refill. Med refill declined.

## 2019-02-05 NOTE — TELEPHONE ENCOUNTER
Patient informed today due for appt. Refill from [facility]. Patient stated will call back for Appt.   Samantha Perea, 02/05/19, 5:16 PM

## 2019-02-20 ENCOUNTER — OFFICE VISIT (OUTPATIENT)
Dept: FAMILY MEDICINE CLINIC | Facility: CLINIC | Age: 44
End: 2019-02-20
Payer: COMMERCIAL

## 2019-02-20 VITALS
HEART RATE: 88 BPM | BODY MASS INDEX: 29.82 KG/M2 | SYSTOLIC BLOOD PRESSURE: 124 MMHG | RESPIRATION RATE: 18 BRPM | WEIGHT: 179 LBS | TEMPERATURE: 97 F | HEIGHT: 65 IN | DIASTOLIC BLOOD PRESSURE: 86 MMHG

## 2019-02-20 DIAGNOSIS — Z79.4 CONTROLLED TYPE 2 DIABETES MELLITUS WITH COMPLICATION, WITH LONG-TERM CURRENT USE OF INSULIN (HCC): Primary | ICD-10-CM

## 2019-02-20 DIAGNOSIS — E11.8 CONTROLLED TYPE 2 DIABETES MELLITUS WITH COMPLICATION, WITH LONG-TERM CURRENT USE OF INSULIN (HCC): Primary | ICD-10-CM

## 2019-02-20 DIAGNOSIS — E78.1 PURE HYPERTRIGLYCERIDEMIA: ICD-10-CM

## 2019-02-20 LAB
ALBUMIN SERPL-MCNC: 3.7 G/DL (ref 3.4–5)
ALBUMIN/GLOB SERPL: 1 {RATIO} (ref 1–2)
ALP LIVER SERPL-CCNC: 96 U/L (ref 45–117)
ALT SERPL-CCNC: 43 U/L (ref 16–61)
ANION GAP SERPL CALC-SCNC: 7 MMOL/L (ref 0–18)
AST SERPL-CCNC: 25 U/L (ref 15–37)
BILIRUB SERPL-MCNC: 0.6 MG/DL (ref 0.1–2)
BUN BLD-MCNC: 20 MG/DL (ref 7–18)
BUN/CREAT SERPL: 20.6 (ref 10–20)
CALCIUM BLD-MCNC: 9 MG/DL (ref 8.5–10.1)
CHLORIDE SERPL-SCNC: 99 MMOL/L (ref 98–107)
CHOLEST SMN-MCNC: 311 MG/DL (ref ?–200)
CO2 SERPL-SCNC: 29 MMOL/L (ref 21–32)
CREAT BLD-MCNC: 0.97 MG/DL (ref 0.7–1.3)
EST. AVERAGE GLUCOSE BLD GHB EST-MCNC: 306 MG/DL (ref 68–126)
GLOBULIN PLAS-MCNC: 3.6 G/DL (ref 2.8–4.4)
GLUCOSE BLD-MCNC: 332 MG/DL (ref 70–99)
HBA1C MFR BLD HPLC: 12.3 % (ref ?–5.7)
HDLC SERPL-MCNC: 44 MG/DL (ref 40–59)
LDLC SERPL DIRECT ASSAY-MCNC: 166 MG/DL (ref ?–100)
M PROTEIN MFR SERPL ELPH: 7.3 G/DL (ref 6.4–8.2)
NONHDLC SERPL-MCNC: 267 MG/DL (ref ?–130)
OSMOLALITY SERPL CALC.SUM OF ELEC: 296 MOSM/KG (ref 275–295)
POTASSIUM SERPL-SCNC: 4.5 MMOL/L (ref 3.5–5.1)
SODIUM SERPL-SCNC: 135 MMOL/L (ref 136–145)
TRIGL SERPL-MCNC: 485 MG/DL (ref 30–149)

## 2019-02-20 PROCEDURE — 80053 COMPREHEN METABOLIC PANEL: CPT | Performed by: FAMILY MEDICINE

## 2019-02-20 PROCEDURE — 80061 LIPID PANEL: CPT | Performed by: FAMILY MEDICINE

## 2019-02-20 PROCEDURE — 83036 HEMOGLOBIN GLYCOSYLATED A1C: CPT | Performed by: FAMILY MEDICINE

## 2019-02-20 PROCEDURE — 83721 ASSAY OF BLOOD LIPOPROTEIN: CPT | Performed by: FAMILY MEDICINE

## 2019-02-20 PROCEDURE — 99214 OFFICE O/P EST MOD 30 MIN: CPT | Performed by: FAMILY MEDICINE

## 2019-02-20 RX ORDER — GLIMEPIRIDE 4 MG/1
4 TABLET ORAL 2 TIMES DAILY
Qty: 180 TABLET | Refills: 0 | Status: SHIPPED | OUTPATIENT
Start: 2019-02-20 | End: 2019-06-19

## 2019-02-20 RX ORDER — FENOFIBRATE 48 MG/1
48 TABLET, COATED ORAL DAILY
Qty: 90 TABLET | Refills: 0 | Status: SHIPPED | OUTPATIENT
Start: 2019-02-20 | End: 2019-06-19

## 2019-02-20 RX ORDER — METFORMIN HYDROCHLORIDE 750 MG/1
TABLET, EXTENDED RELEASE ORAL
Qty: 180 TABLET | Refills: 0 | Status: SHIPPED | OUTPATIENT
Start: 2019-02-20 | End: 2019-06-19

## 2019-02-20 RX ORDER — METFORMIN HYDROCHLORIDE 500 MG/1
500 TABLET, EXTENDED RELEASE ORAL
Qty: 30 TABLET | Refills: 2 | Status: SHIPPED | OUTPATIENT
Start: 2019-02-20 | End: 2019-06-19

## 2019-02-20 NOTE — PATIENT INSTRUCTIONS
Continue current medications  Advice low carb in diet, AVOID FOOD WITH HIGH GLYCEMIC INDEX, have smaller portion meal, no juice or soda, don't eat late at night, exercise, weight loss.    Continue to monitor glucose level, call if glucose level less than 70

## 2019-02-20 NOTE — PROGRESS NOTES
[facility] [place]  PROGRESS NOTE  Chief Complaint:   Patient presents with:  Medication Follow-Up  Diabetes: 100s-200s      HPI:   This is a 38year old male with history of diabetes and hypertriglyceridemia presents for follow-up and medicatio 750 MG Oral Tablet 24 Hr TAKE 1 TABLET BY MOUTH TWICE DAILY WITH MEALS, ONE  AFTER  BREAKFAST  AND  ONE  AFTER  DINNER Disp: 180 tablet Rfl: 0   metFORMIN HCl  MG Oral Tablet 24 Hr Take 1 tablet (500 mg total) by mouth After lunch.  Disp: 30 tablet Rf Pulse 88   Temp 97 °F (36.1 °C) (Tympanic)   Resp 18   Ht 65\"   Wt 179 lb   BMI 29.79 kg/m²  Estimated body mass index is 29.79 kg/m² as calculated from the following:    Height as of this encounter: 65\". Weight as of this encounter: 179 lb.    Vital s Take 1 tablet (48 mg total) by mouth daily.  -     glimepiride 4 MG Oral Tab; Take 1 tablet (4 mg total) by mouth 2 (two) times daily. -     insulin glargine (LANTUS SOLOSTAR) 100 UNIT/ML Subcutaneous Solution Pen-injector;  Inject 10 Units into the skin n

## 2019-02-21 ENCOUNTER — TELEPHONE (OUTPATIENT)
Dept: FAMILY MEDICINE CLINIC | Facility: CLINIC | Age: 44
End: 2019-02-21

## 2019-02-21 DIAGNOSIS — Z79.4 CONTROLLED TYPE 2 DIABETES MELLITUS WITH COMPLICATION, WITH LONG-TERM CURRENT USE OF INSULIN (HCC): ICD-10-CM

## 2019-02-21 DIAGNOSIS — E11.8 CONTROLLED TYPE 2 DIABETES MELLITUS WITH COMPLICATION, WITH LONG-TERM CURRENT USE OF INSULIN (HCC): ICD-10-CM

## 2019-02-21 DIAGNOSIS — E78.1 PURE HYPERTRIGLYCERIDEMIA: Primary | ICD-10-CM

## 2019-02-21 RX ORDER — ATORVASTATIN CALCIUM 20 MG/1
20 TABLET, FILM COATED ORAL DAILY
Qty: 90 TABLET | Refills: 1 | Status: SHIPPED | OUTPATIENT
Start: 2019-02-21

## 2019-02-21 RX ORDER — ATORVASTATIN CALCIUM 10 MG/1
20 TABLET, FILM COATED ORAL DAILY
Qty: 90 TABLET | Refills: 0 | Status: SHIPPED | OUTPATIENT
Start: 2019-02-21 | End: 2019-02-21

## 2019-02-21 NOTE — TELEPHONE ENCOUNTER
Pharmacy is asking if we can change Atorvastatin 10mg to 20mg once a day? Insurance will not cover the atorvastatin 10 mg 2 tablets daily.

## 2019-02-21 NOTE — TELEPHONE ENCOUNTER
Please inform patient that his hemoglobin A1c is 12.3, he needs better control of his diabetes, recommend to start on Lantus as discussed during office visit and restart all his medications. His total cholesterol and triglycerides remains elevated.   Recom

## 2019-02-21 NOTE — TELEPHONE ENCOUNTER
Let pt know the following below. Pt verbalized his understanding and had no other questions at this time.    Faxed to Dr Kaleigh Pantoja

## 2019-04-30 RX ORDER — INSULIN GLARGINE 100 [IU]/ML
INJECTION, SOLUTION SUBCUTANEOUS
Qty: 1 PEN | Refills: 0 | Status: SHIPPED | OUTPATIENT
Start: 2019-04-30 | End: 2019-06-19

## 2019-06-05 ENCOUNTER — TELEPHONE (OUTPATIENT)
Dept: FAMILY MEDICINE CLINIC | Facility: CLINIC | Age: 44
End: 2019-06-05

## 2019-06-05 RX ORDER — METFORMIN HYDROCHLORIDE 750 MG/1
TABLET, EXTENDED RELEASE ORAL
Qty: 180 TABLET | Refills: 0 | OUTPATIENT
Start: 2019-06-05

## 2019-06-05 RX ORDER — METFORMIN HYDROCHLORIDE 500 MG/1
500 TABLET, EXTENDED RELEASE ORAL
Qty: 30 TABLET | Refills: 2 | OUTPATIENT
Start: 2019-06-05

## 2019-06-05 RX ORDER — GLIMEPIRIDE 4 MG/1
4 TABLET ORAL 2 TIMES DAILY
Qty: 180 TABLET | Refills: 0 | OUTPATIENT
Start: 2019-06-05

## 2019-06-05 NOTE — TELEPHONE ENCOUNTER
needs refill on all diabetic meds- pt stated that he does not have time to schedule an appt anytime sooner than 3 weeks for labs if needed but does need his meds refilled- needs januvia, lantus, metformin, and glimiperide sent to [address] [place] [address] in [place]

## 2019-06-05 NOTE — TELEPHONE ENCOUNTER
Future Appointments   Date Time Provider Jaime Garcia   6/19/2019  7:20 AM Jazzy Vicente MD [facility] [place]     Let pt know the following below. Pt verbalized his understanding and had no other questions at this time.

## 2019-06-05 NOTE — TELEPHONE ENCOUNTER
Patient is due for appointment. Recommend to schedule appointment with me for refill. Med refill declined. Patient needs to be seen in clinic for refill.

## 2019-06-19 ENCOUNTER — OFFICE VISIT (OUTPATIENT)
Dept: FAMILY MEDICINE CLINIC | Facility: CLINIC | Age: 44
End: 2019-06-19
Payer: COMMERCIAL

## 2019-06-19 VITALS
RESPIRATION RATE: 18 BRPM | SYSTOLIC BLOOD PRESSURE: 134 MMHG | HEIGHT: 65 IN | HEART RATE: 82 BPM | OXYGEN SATURATION: 100 % | TEMPERATURE: 96 F | WEIGHT: 184 LBS | BODY MASS INDEX: 30.66 KG/M2 | DIASTOLIC BLOOD PRESSURE: 86 MMHG

## 2019-06-19 DIAGNOSIS — E78.1 PURE HYPERTRIGLYCERIDEMIA: ICD-10-CM

## 2019-06-19 DIAGNOSIS — E11.65 UNCONTROLLED TYPE 2 DIABETES MELLITUS WITH HYPERGLYCEMIA (HCC): Primary | ICD-10-CM

## 2019-06-19 DIAGNOSIS — Z91.19 NONCOMPLIANCE WITH DIABETES TREATMENT: ICD-10-CM

## 2019-06-19 PROBLEM — Z91.199 NONCOMPLIANCE WITH DIABETES TREATMENT: Status: ACTIVE | Noted: 2019-06-19

## 2019-06-19 PROCEDURE — 80061 LIPID PANEL: CPT | Performed by: FAMILY MEDICINE

## 2019-06-19 PROCEDURE — 36416 COLLJ CAPILLARY BLOOD SPEC: CPT | Performed by: FAMILY MEDICINE

## 2019-06-19 PROCEDURE — 83721 ASSAY OF BLOOD LIPOPROTEIN: CPT | Performed by: FAMILY MEDICINE

## 2019-06-19 PROCEDURE — 99214 OFFICE O/P EST MOD 30 MIN: CPT | Performed by: FAMILY MEDICINE

## 2019-06-19 PROCEDURE — 83036 HEMOGLOBIN GLYCOSYLATED A1C: CPT | Performed by: FAMILY MEDICINE

## 2019-06-19 PROCEDURE — 80053 COMPREHEN METABOLIC PANEL: CPT | Performed by: FAMILY MEDICINE

## 2019-06-19 RX ORDER — GLIMEPIRIDE 4 MG/1
4 TABLET ORAL 2 TIMES DAILY
Qty: 60 TABLET | Refills: 2 | Status: SHIPPED | OUTPATIENT
Start: 2019-06-19 | End: 2019-10-19

## 2019-06-19 RX ORDER — METFORMIN HYDROCHLORIDE 750 MG/1
TABLET, EXTENDED RELEASE ORAL
Qty: 60 TABLET | Refills: 2 | Status: SHIPPED | OUTPATIENT
Start: 2019-06-19 | End: 2019-10-19

## 2019-06-19 RX ORDER — METFORMIN HYDROCHLORIDE 500 MG/1
500 TABLET, EXTENDED RELEASE ORAL
Qty: 30 TABLET | Refills: 2 | Status: SHIPPED | OUTPATIENT
Start: 2019-06-19 | End: 2019-10-19

## 2019-06-19 RX ORDER — LISINOPRIL 2.5 MG/1
2.5 TABLET ORAL DAILY
Qty: 30 TABLET | Refills: 2 | Status: SHIPPED | OUTPATIENT
Start: 2019-06-19 | End: 2019-10-19

## 2019-06-19 RX ORDER — FENOFIBRATE 48 MG/1
48 TABLET, COATED ORAL DAILY
Qty: 30 TABLET | Refills: 2 | Status: SHIPPED | OUTPATIENT
Start: 2019-06-19 | End: 2019-10-19

## 2019-06-19 NOTE — PROGRESS NOTES
[facility]  PROGRESS NOTE  Chief Complaint:   Patient presents with:  Medication Follow-Up      HPI:   This is a 38year old male With history of diabetes and hyper triglyceridemia presents for follow-up and medication refill  Has not be glargine (LANTUS SOLOSTAR) 100 UNIT/ML Subcutaneous Solution Pen-injector INJECT 15 UNITS INTO THE SKIN NIGHTLY Disp: 1 pen Rfl: 2   SITagliptin Phosphate (JANUVIA) 100 MG Oral Tab Take 1 tablet (100 mg total) by mouth once daily.  Disp: 30 tablet Rfl: 2 control. HEMATOLOGIC:  Denies anemia, bleeding or bruising. PSYCHIATRIC:  Denies depression or anxiety. ENDOCRINOLOGIC:  Denies excessive sweating, cold or heat intolerance, polyuria or polydipsia.       EXAM:   /86 (BP Location: Left arm, Patient 100 UNIT/ML Subcutaneous Solution Pen-injector; INJECT 15 UNITS INTO THE SKIN NIGHTLY  -     SITagliptin Phosphate (JANUVIA) 100 MG Oral Tab; Take 1 tablet (100 mg total) by mouth once daily.   -     metFORMIN HCl  MG Oral Tablet 24 Hr; TAKE 1 TABLET software.  Please excuse any grammatical errors. Call my office if you have any questions regarding this note.

## 2019-06-19 NOTE — PATIENT INSTRUCTIONS
Continue current medications  Increase lantus to 15 units. Check labs today.    See Endocrinologist.   Advice low carb in diet, AVOID FOOD WITH HIGH GLYCEMIC INDEX, have smaller portion meal, no juice or soda, don't eat late at night, exercise, weight los

## 2019-06-20 ENCOUNTER — TELEPHONE (OUTPATIENT)
Dept: FAMILY MEDICINE CLINIC | Facility: CLINIC | Age: 44
End: 2019-06-20

## 2019-06-20 NOTE — TELEPHONE ENCOUNTER
Let pt know the following below. Pt verbalized his understanding and had no other questions at this time. Patient states that he has appt on 7/11/19 with Endo. Will fax results to his office.

## 2019-06-20 NOTE — TELEPHONE ENCOUNTER
----- Message from Mert Sorensen MD sent at 6/20/2019  7:35 AM CDT -----  Please inform patient that his hemoglobin A1c is 8.9, his glucose level is 321.   He needs better control of his diabetes, recommend to see endocrinologist as discussed during office

## 2019-10-18 ENCOUNTER — TELEPHONE (OUTPATIENT)
Dept: FAMILY MEDICINE CLINIC | Facility: CLINIC | Age: 44
End: 2019-10-18

## 2019-10-19 ENCOUNTER — APPOINTMENT (OUTPATIENT)
Dept: LAB | Age: 44
End: 2019-10-19
Attending: NURSE PRACTITIONER
Payer: COMMERCIAL

## 2019-10-19 ENCOUNTER — OFFICE VISIT (OUTPATIENT)
Dept: FAMILY MEDICINE CLINIC | Facility: CLINIC | Age: 44
End: 2019-10-19
Payer: COMMERCIAL

## 2019-10-19 VITALS
DIASTOLIC BLOOD PRESSURE: 92 MMHG | BODY MASS INDEX: 28.96 KG/M2 | RESPIRATION RATE: 18 BRPM | WEIGHT: 173.81 LBS | OXYGEN SATURATION: 97 % | HEART RATE: 92 BPM | HEIGHT: 65 IN | SYSTOLIC BLOOD PRESSURE: 128 MMHG | TEMPERATURE: 96 F

## 2019-10-19 DIAGNOSIS — I10 ESSENTIAL HYPERTENSION: ICD-10-CM

## 2019-10-19 DIAGNOSIS — E11.65 UNCONTROLLED TYPE 2 DIABETES MELLITUS WITH HYPERGLYCEMIA (HCC): ICD-10-CM

## 2019-10-19 DIAGNOSIS — E78.1 PURE HYPERTRIGLYCERIDEMIA: ICD-10-CM

## 2019-10-19 DIAGNOSIS — Z23 NEED FOR VACCINATION: ICD-10-CM

## 2019-10-19 DIAGNOSIS — Z12.5 SCREENING FOR PROSTATE CANCER: ICD-10-CM

## 2019-10-19 DIAGNOSIS — Z00.00 ENCOUNTER FOR HEALTH MAINTENANCE EXAMINATION IN ADULT: Primary | ICD-10-CM

## 2019-10-19 PROCEDURE — 84439 ASSAY OF FREE THYROXINE: CPT | Performed by: NURSE PRACTITIONER

## 2019-10-19 PROCEDURE — 85025 COMPLETE CBC W/AUTO DIFF WBC: CPT | Performed by: NURSE PRACTITIONER

## 2019-10-19 PROCEDURE — 90471 IMMUNIZATION ADMIN: CPT | Performed by: NURSE PRACTITIONER

## 2019-10-19 PROCEDURE — 80061 LIPID PANEL: CPT | Performed by: NURSE PRACTITIONER

## 2019-10-19 PROCEDURE — 84443 ASSAY THYROID STIM HORMONE: CPT | Performed by: NURSE PRACTITIONER

## 2019-10-19 PROCEDURE — 99396 PREV VISIT EST AGE 40-64: CPT | Performed by: NURSE PRACTITIONER

## 2019-10-19 PROCEDURE — 90686 IIV4 VACC NO PRSV 0.5 ML IM: CPT | Performed by: NURSE PRACTITIONER

## 2019-10-19 PROCEDURE — 84153 ASSAY OF PSA TOTAL: CPT | Performed by: NURSE PRACTITIONER

## 2019-10-19 PROCEDURE — 36415 COLL VENOUS BLD VENIPUNCTURE: CPT | Performed by: NURSE PRACTITIONER

## 2019-10-19 RX ORDER — FENOFIBRATE 48 MG/1
48 TABLET, COATED ORAL DAILY
Qty: 30 TABLET | Refills: 5 | Status: SHIPPED | OUTPATIENT
Start: 2019-10-19

## 2019-10-19 RX ORDER — DULAGLUTIDE 1.5 MG/.5ML
INJECTION, SOLUTION SUBCUTANEOUS
COMMUNITY
Start: 2019-10-11

## 2019-10-19 RX ORDER — LISINOPRIL 2.5 MG/1
2.5 TABLET ORAL DAILY
Qty: 30 TABLET | Refills: 5 | Status: SHIPPED | OUTPATIENT
Start: 2019-10-19

## 2019-10-19 NOTE — PATIENT INSTRUCTIONS
Monitor blood pressure–continue with same medications refill sent.     Continue diabetic care with Dr. Gypsy Chinchilla    We will check fasting blood work today    Flu shot today

## 2019-10-19 NOTE — PROGRESS NOTES
HPI:    Patient ID: Beatriz Ballard is a 38year old male. HPI patient is here today for his annual physical.  States overall he feels well. Patient has been seeing endocrinology for his diabetes (Dr. Becky Rees)  He did have a recent A1c that was 7.9.   He Rfl:   omeprazole (PRILOSEC) 20 MG Oral Capsule Delayed Release, Take 20 mg by mouth., Disp: , Rfl:       Allergies:  Niacin                  OTHER (SEE COMMENTS)    Comment:Other reaction(s): itching and burning-- 6/19/19             has tried 1/2 tablet tablet 5     Sig: Take 1 tablet (2.5 mg total) by mouth daily. Imaging & Referrals:  FLULAVAL INFLUENZA VACCINE QUAD PRESERVATIVE FREE 0.5 ML    Patient Instructions   Monitor blood pressure–continue with same medications refill sent.     Continue rupert

## 2019-10-21 ENCOUNTER — TELEPHONE (OUTPATIENT)
Dept: FAMILY MEDICINE CLINIC | Facility: CLINIC | Age: 44
End: 2019-10-21

## 2019-10-21 NOTE — TELEPHONE ENCOUNTER
----- Message from GONZALES Suazo sent at 10/21/2019  7:24 AM CDT -----  Please notify patient that his blood work is essentially normal–CBC, CMP, thyroid, prostate  His cholesterol has improved it is now 247–was 291–his HDLs are good, high at 180,

## 2019-10-21 NOTE — TELEPHONE ENCOUNTER
Informed pt of his blood work results. Pt will be dropping off form to be filled out. Pt expressed understanding and will stay on same meds.

## 2019-10-23 ENCOUNTER — TELEPHONE (OUTPATIENT)
Dept: FAMILY MEDICINE CLINIC | Facility: CLINIC | Age: 44
End: 2019-10-23

## 2020-05-07 ENCOUNTER — PATIENT OUTREACH (OUTPATIENT)
Dept: FAMILY MEDICINE CLINIC | Facility: CLINIC | Age: 45
End: 2020-05-07

## 2020-05-07 NOTE — PROGRESS NOTES
Spoke with patient–discussed with patient that we do not have a current A1c on record–patient states that he saw Dr. Angel Locke in [facility] work was done at the [facility]. Discussed with patient to have them send us his A1c first chart.   Also discu

## 2020-08-05 LAB — AMB EXT HGBA1C: 7.6 %

## 2022-06-20 ENCOUNTER — PATIENT OUTREACH (OUTPATIENT)
Dept: FAMILY MEDICINE CLINIC | Facility: CLINIC | Age: 47
End: 2022-06-20

## 2022-06-20 LAB
ALBUMIN: 4.5 G/DL
ALKALINE PHOSPHATASE: 79
ALT: 26
ANION GAP: 10
AST: 20
BILIRUBIN, TOTAL: 0.7 MG/DL
BUN: 16
CALCIUM: 9.9
CHLORIDE: 101
CHOL/HDL RATIO: 4.4
CO2: 28
CREATININE: 0.85 MG/DL
GFR NON-AFRICAN AMERICAN: >90
GLUCOSE: 129
HDL CHOLESTEROL: 47 MG/DL
LDL CHOLESTEROL: 115 MG/DL (ref ?–130)
Lab: 99.9 MG/DL
MICROALBUMIN/CREATININE RATIO, RANDOM URINE: 127
MICROALBUMIN: 12.7
NON HDL CHOL: 158
POTASSIUM: 4.4
SODIUM: 139
TOTAL CHOLESTEROL: 205 MG/DL (ref ?–200)
TOTAL PROTEIN: 7.1
TRIGLYCERIDES: 217 MG/DL

## 2022-06-20 NOTE — PROGRESS NOTES
Called pt to schedule 2022 physical and care gaps. Pt states since it is not required by his wife's insurance he will not be doing a physical at this time. Informed pt to come in before October so he does not turn into new patient which we are not accepting at this time. He verbalized understanding. Pt states Dr. Fariha Metcalf is managing his diabetes and he has completed his A1C and other necessary testing with them. Pt has not done a colonoscopy.

## 2022-11-14 ENCOUNTER — OFFICE VISIT (OUTPATIENT)
Dept: FAMILY MEDICINE CLINIC | Facility: CLINIC | Age: 47
End: 2022-11-14
Payer: COMMERCIAL

## 2022-11-14 VITALS
DIASTOLIC BLOOD PRESSURE: 80 MMHG | BODY MASS INDEX: 29.53 KG/M2 | WEIGHT: 173 LBS | HEIGHT: 64 IN | TEMPERATURE: 98 F | RESPIRATION RATE: 18 BRPM | SYSTOLIC BLOOD PRESSURE: 128 MMHG | OXYGEN SATURATION: 95 % | HEART RATE: 95 BPM

## 2022-11-14 DIAGNOSIS — E78.1 PURE HYPERTRIGLYCERIDEMIA: ICD-10-CM

## 2022-11-14 DIAGNOSIS — E11.65 UNCONTROLLED TYPE 2 DIABETES MELLITUS WITH HYPERGLYCEMIA (HCC): ICD-10-CM

## 2022-11-14 DIAGNOSIS — Z87.891 FORMER SMOKER: ICD-10-CM

## 2022-11-14 DIAGNOSIS — Z00.00 ANNUAL PHYSICAL EXAM: Primary | ICD-10-CM

## 2022-11-14 DIAGNOSIS — Z23 ENCOUNTER FOR IMMUNIZATION: ICD-10-CM

## 2022-11-14 DIAGNOSIS — H61.22 IMPACTED CERUMEN OF LEFT EAR: ICD-10-CM

## 2022-11-14 LAB
ALBUMIN: 4.3 G/DL (ref 3.5–5)
ALKALINE PHOSPHATASE: 69 (ref 34–104)
ALT (SGPT): 31 IU/L (ref 11–51)
ANION GAP: 8 (ref 4–13)
AST (SGOT): 33 IU/L (ref 13–39)
BILIRUBIN, TOTAL: 0.6 MG/DL (ref 0.2–1.2)
BUN: 19 (ref 7–25)
CALCIUM: 9.5 (ref 8.3–10.5)
CARBON DIOXIDE: 28 (ref 21–31)
CHLORIDE: 102 (ref 98–107)
CHOLESTEROL: 257 (ref 0–199)
CREATININE: 0.97 MG/DL (ref 0.6–1.3)
GLOM FILT RATE, EST: >90
GLUCOSE: 123 (ref 70–100)
HDL CHOLESTEROL: 44 MG/DL
HEMOGLOBIN A1C: 7.6 % (ref 0–5.6)
LDL CHOLESTEROL CALC: 160 MG/DL (ref 0–99)
NON HDL CHOL: 213
POTASSIUM: 4.3 (ref 3.5–5.1)
PROTEIN, TOTAL: 6.7 (ref 6.4–8.3)
SODIUM: 138 (ref 133–146)
T. CHOL/HDL RATIO: 5.8 RATIO UNITS (ref 0–5)
TRIGLYCERIDES: 266 MG/DL (ref 0–150)
TSH: 0.71 UIU/ML (ref 0.3–5.33)

## 2022-11-14 RX ORDER — EMPAGLIFLOZIN AND METFORMIN HYDROCHLORIDE 12.5; 1 MG/1; MG/1
1 TABLET ORAL 2 TIMES DAILY
COMMUNITY
Start: 2022-11-12

## 2022-11-14 RX ORDER — INSULIN GLARGINE-YFGN 100 [IU]/ML
34 INJECTION, SOLUTION SUBCUTANEOUS DAILY
COMMUNITY
Start: 2022-10-07

## 2022-11-14 RX ORDER — ATORVASTATIN CALCIUM 10 MG/1
10 TABLET, FILM COATED ORAL DAILY
COMMUNITY
Start: 2022-11-12

## 2022-11-14 NOTE — PROGRESS NOTES
Labs from [facility] done 7/14/22 downloaded and entered for today's annual physical appointment.

## 2022-11-22 ENCOUNTER — LABORATORY ENCOUNTER (OUTPATIENT)
Dept: LAB | Age: 47
End: 2022-11-22
Attending: FAMILY MEDICINE
Payer: COMMERCIAL

## 2022-11-22 DIAGNOSIS — Z00.00 ANNUAL PHYSICAL EXAM: ICD-10-CM

## 2022-11-22 LAB
ALBUMIN SERPL-MCNC: 4 G/DL (ref 3.4–5)
ALBUMIN/GLOB SERPL: 1.3 {RATIO} (ref 1–2)
ALP LIVER SERPL-CCNC: 78 U/L
ALT SERPL-CCNC: 39 U/L
ANION GAP SERPL CALC-SCNC: 2 MMOL/L (ref 0–18)
AST SERPL-CCNC: 19 U/L (ref 15–37)
BASOPHILS # BLD AUTO: 0.04 X10(3) UL (ref 0–0.2)
BASOPHILS NFR BLD AUTO: 0.6 %
BILIRUB SERPL-MCNC: 0.5 MG/DL (ref 0.1–2)
BILIRUB UR QL STRIP.AUTO: NEGATIVE
BUN BLD-MCNC: 22 MG/DL (ref 7–18)
CALCIUM BLD-MCNC: 9.6 MG/DL (ref 8.5–10.1)
CHLORIDE SERPL-SCNC: 105 MMOL/L (ref 98–112)
CHOLEST SERPL-MCNC: 176 MG/DL (ref ?–200)
CLARITY UR REFRACT.AUTO: CLEAR
CO2 SERPL-SCNC: 31 MMOL/L (ref 21–32)
COLOR UR AUTO: YELLOW
CREAT BLD-MCNC: 1.01 MG/DL
EOSINOPHIL # BLD AUTO: 0.2 X10(3) UL (ref 0–0.7)
EOSINOPHIL NFR BLD AUTO: 2.9 %
ERYTHROCYTE [DISTWIDTH] IN BLOOD BY AUTOMATED COUNT: 12.8 %
FASTING PATIENT LIPID ANSWER: YES
FASTING STATUS PATIENT QL REPORTED: YES
GFR SERPLBLD BASED ON 1.73 SQ M-ARVRAT: 93 ML/MIN/1.73M2 (ref 60–?)
GLOBULIN PLAS-MCNC: 3 G/DL (ref 2.8–4.4)
GLUCOSE BLD-MCNC: 115 MG/DL (ref 70–99)
GLUCOSE UR STRIP.AUTO-MCNC: 500 MG/DL
HCT VFR BLD AUTO: 54.2 %
HDLC SERPL-MCNC: 52 MG/DL (ref 40–59)
HGB BLD-MCNC: 18.1 G/DL
IMM GRANULOCYTES # BLD AUTO: 0.02 X10(3) UL (ref 0–1)
IMM GRANULOCYTES NFR BLD: 0.3 %
LDLC SERPL CALC-MCNC: 96 MG/DL (ref ?–100)
LEUKOCYTE ESTERASE UR QL STRIP.AUTO: NEGATIVE
LYMPHOCYTES # BLD AUTO: 2.14 X10(3) UL (ref 1–4)
LYMPHOCYTES NFR BLD AUTO: 30.7 %
MCH RBC QN AUTO: 30.1 PG (ref 26–34)
MCHC RBC AUTO-ENTMCNC: 33.4 G/DL (ref 31–37)
MCV RBC AUTO: 90 FL
MONOCYTES # BLD AUTO: 0.43 X10(3) UL (ref 0.1–1)
MONOCYTES NFR BLD AUTO: 6.2 %
NEUTROPHILS # BLD AUTO: 4.13 X10 (3) UL (ref 1.5–7.7)
NEUTROPHILS # BLD AUTO: 4.13 X10(3) UL (ref 1.5–7.7)
NEUTROPHILS NFR BLD AUTO: 59.3 %
NITRITE UR QL STRIP.AUTO: NEGATIVE
NONHDLC SERPL-MCNC: 124 MG/DL (ref ?–130)
OSMOLALITY SERPL CALC.SUM OF ELEC: 290 MOSM/KG (ref 275–295)
PH UR STRIP.AUTO: 5.5 [PH] (ref 5–8)
PLATELET # BLD AUTO: 229 10(3)UL (ref 150–450)
POTASSIUM SERPL-SCNC: 4.2 MMOL/L (ref 3.5–5.1)
PROT SERPL-MCNC: 7 G/DL (ref 6.4–8.2)
RBC # BLD AUTO: 6.02 X10(6)UL
RBC UR QL AUTO: NEGATIVE
SODIUM SERPL-SCNC: 138 MMOL/L (ref 136–145)
SP GR UR STRIP.AUTO: 1.02 (ref 1–1.03)
TRIGL SERPL-MCNC: 162 MG/DL (ref 30–149)
TSI SER-ACNC: 1.03 MIU/ML (ref 0.36–3.74)
UROBILINOGEN UR STRIP.AUTO-MCNC: 0.2 MG/DL
VLDLC SERPL CALC-MCNC: 27 MG/DL (ref 0–30)
WBC # BLD AUTO: 7 X10(3) UL (ref 4–11)

## 2022-11-22 PROCEDURE — 80053 COMPREHEN METABOLIC PANEL: CPT

## 2022-11-22 PROCEDURE — 81001 URINALYSIS AUTO W/SCOPE: CPT

## 2022-11-22 PROCEDURE — 80061 LIPID PANEL: CPT

## 2023-01-17 PROCEDURE — 3051F HG A1C>EQUAL 7.0%<8.0%: CPT | Performed by: NURSE PRACTITIONER

## 2023-05-15 ENCOUNTER — OFFICE VISIT (OUTPATIENT)
Dept: FAMILY MEDICINE CLINIC | Facility: CLINIC | Age: 48
End: 2023-05-15
Payer: COMMERCIAL

## 2023-05-15 VITALS
TEMPERATURE: 98 F | BODY MASS INDEX: 29.37 KG/M2 | DIASTOLIC BLOOD PRESSURE: 80 MMHG | HEART RATE: 100 BPM | HEIGHT: 64 IN | SYSTOLIC BLOOD PRESSURE: 132 MMHG | OXYGEN SATURATION: 96 % | WEIGHT: 172 LBS | RESPIRATION RATE: 16 BRPM

## 2023-05-15 DIAGNOSIS — L98.9 LESION OF FACE: Primary | ICD-10-CM

## 2023-05-15 PROCEDURE — 3008F BODY MASS INDEX DOCD: CPT | Performed by: NURSE PRACTITIONER

## 2023-05-15 PROCEDURE — 3079F DIAST BP 80-89 MM HG: CPT | Performed by: NURSE PRACTITIONER

## 2023-05-15 PROCEDURE — 3075F SYST BP GE 130 - 139MM HG: CPT | Performed by: NURSE PRACTITIONER

## 2023-05-15 PROCEDURE — 99214 OFFICE O/P EST MOD 30 MIN: CPT | Performed by: NURSE PRACTITIONER

## 2023-05-15 RX ORDER — CEPHALEXIN 500 MG/1
500 CAPSULE ORAL 3 TIMES DAILY
Qty: 21 CAPSULE | Refills: 0 | Status: SHIPPED | OUTPATIENT
Start: 2023-05-15 | End: 2023-05-22

## (undated) NOTE — MR AVS SNAPSHOT
[facility] [address] [place] [address],  [address]  [phone]               Thank you for choosing us for your health care visit with Jose Miguel Valerio MD.  We are glad to serve you and happy to provide you with this summary of yo What changed:    - medication strength  - how much to take  - when to take this   Commonly known as:  GLUCOPHAGE           PRILOSEC 20 MG Cpdr   Generic drug:  omeprazole   Take 20 mg by mouth daily.            tobramycin-dexamethasone 0.3-0.1 % Oint   Plac Eat plenty of protein, keep the fat content low Sugars:  sodas and sports drinks, candies and desserts   Eat plenty of low-fat dairy products High fat meats and dairy   Choose whole grain products Foods high in sodium   Water is best for hydration Fast dani

## (undated) NOTE — MR AVS SNAPSHOT
[facility] [address]  [place] [address] [id number]  [phone]               Thank you for choosing us for your health care visit with George Brown MD.  We are glad to serve you and happy to provide you with this summary of authorization numbers or be assured that none are required. You can then schedule your appointment. Failure to obtain required authorization numbers can create reimbursement difficulties for you.     Functional Status questions complete?:      Assoc Dx:  [facility] Sign up for [employer], your secure online medical record. Amitree will allow you to access patient instructions from your recent visit,  view other health information, and more. To sign up or find more information, go to [email or web address]